# Patient Record
Sex: FEMALE | Race: WHITE | NOT HISPANIC OR LATINO | Employment: FULL TIME | URBAN - METROPOLITAN AREA
[De-identification: names, ages, dates, MRNs, and addresses within clinical notes are randomized per-mention and may not be internally consistent; named-entity substitution may affect disease eponyms.]

---

## 2024-01-24 ENCOUNTER — OFFICE VISIT (OUTPATIENT)
Dept: OBGYN CLINIC | Facility: CLINIC | Age: 54
End: 2024-01-24
Payer: COMMERCIAL

## 2024-01-24 VITALS
DIASTOLIC BLOOD PRESSURE: 70 MMHG | WEIGHT: 155.4 LBS | SYSTOLIC BLOOD PRESSURE: 128 MMHG | HEIGHT: 64 IN | BODY MASS INDEX: 26.53 KG/M2

## 2024-01-24 DIAGNOSIS — Z01.419 WOMEN'S ANNUAL ROUTINE GYNECOLOGICAL EXAMINATION: Primary | ICD-10-CM

## 2024-01-24 DIAGNOSIS — Z12.4 SCREENING FOR CERVICAL CANCER: ICD-10-CM

## 2024-01-24 DIAGNOSIS — Z12.31 BREAST CANCER SCREENING BY MAMMOGRAM: ICD-10-CM

## 2024-01-24 PROCEDURE — S0610 ANNUAL GYNECOLOGICAL EXAMINA: HCPCS | Performed by: STUDENT IN AN ORGANIZED HEALTH CARE EDUCATION/TRAINING PROGRAM

## 2024-01-24 RX ORDER — BUPROPION HYDROCHLORIDE 300 MG/1
300 TABLET ORAL EVERY MORNING
COMMUNITY
Start: 2023-11-26

## 2024-01-24 RX ORDER — RIZATRIPTAN BENZOATE 10 MG/1
TABLET ORAL
COMMUNITY
Start: 2023-12-15

## 2024-01-24 NOTE — PROGRESS NOTES
[facility] [address], [address], [place] [address]    ASSESSMENT/PLAN: Irma Moyer is a 53 y.o.  who presents for annual gynecologic exam.    Encounter for routine gynecologic examination  - Routine well woman exam completed today.  - Cervical Cancer Screening: Current ASCCP Guidelines reviewed. Last Pap: 2018. History of abnormal: None. Repeat collected today.   - STI screening offered including HIV testing: offered, pt declined  - Breast Cancer Screening: Last Mammogram Not on file. Repeat ordered today.   - Colorectal cancer screening was not ordered, as she reports that she is up to date.   - The following were reviewed in today's visit: breast self exam, mammography screening ordered, menopause, exercise, and healthy diet    Additional problems addressed during this visit:  1. Women's annual routine gynecological examination    2. Breast cancer screening by mammogram  -     Mammo screening bilateral w 3d & cad; Future    3. Screening for cervical cancer  -     Liquid-based pap, screening        CC:  Annual Gynecologic Examination    HPI: Irma Moyer is a 53 y.o.  who presents for annual gynecologic examination. She is without complaint today.     ROS: Negative except as noted in HPI    No LMP recorded. Patient is postmenopausal.       She  reports being sexually active and has had partner(s) who are male. She reports using the following method of birth control/protection: Post-menopausal.  Sexual History  Sexual Assault: No  Sexually Transmitted Infection History: None  Multiple Sex Partners: No  Is Patient in a Monogamous Relationship?: Yes    The following portions of the patient's history were reviewed and updated as appropriate:  Past Medical History:   Diagnosis Date    Hypercholesterolemia     Migraines      Past Surgical History:   Procedure Laterality Date    DILATION AND CURETTAGE OF UTERUS N/A     LAPAROSCOPY N/A 2004    SALPINGECTOMY Bilateral  "2004     Family History   Problem Relation Age of Onset    No Known Problems Mother     Lung cancer Father     Alcohol abuse Father     Hypertension Brother     Obesity Brother     Hashimoto's thyroiditis Daughter     Anxiety disorder Daughter     No Known Problems Son     No Known Problems Maternal Grandmother     Heart disease Maternal Grandfather     Thyroid disease Other     Cancer Paternal Aunt     Breast cancer Neg Hx     Uterine cancer Neg Hx     Ovarian cancer Neg Hx     Colon cancer Neg Hx      Social History     Socioeconomic History    Marital status: /Civil Union     Spouse name: None    Number of children: None    Years of education: None    Highest education level: None   Occupational History    None   Tobacco Use    Smoking status: Former     Types: Cigarettes    Smokeless tobacco: Never   Vaping Use    Vaping status: Never Used   Substance and Sexual Activity    Alcohol use: Yes    Drug use: Never    Sexual activity: Yes     Partners: Male     Birth control/protection: Post-menopausal     Comment: no new partner in past year   Other Topics Concern    None   Social History Narrative    None     Social Determinants of Health     Financial Resource Strain: Not on file   Food Insecurity: Not on file   Transportation Needs: Not on file   Physical Activity: Not on file   Stress: Not on file   Social Connections: Not on file   Intimate Partner Violence: Not on file   Housing Stability: Not on file     Outpatient Medications Marked as Taking for the 1/24/24 encounter (Office Visit) with Sameer Olmedo MD   Medication    buPROPion (WELLBUTRIN XL) 300 mg 24 hr tablet    rizatriptan (MAXALT) 10 mg tablet     No Known Allergies        Objective:  /70 (BP Location: Right arm, Patient Position: Sitting, Cuff Size: Standard)   Ht 5' 3.75\" (1.619 m)   Wt 70.5 kg (155 lb 6.4 oz)   BMI 26.88 kg/m²        Chaperone present? Yes: Elis Armando MA.    General Appearance: alert and oriented, in no acute " distress.   Neck/Thyroid: No thyromegaly, no thyroid nodules.  Respiratory: Unlabored breathing.  Cardiovascular: Regular rate, no peripheral edema.  Abdomen: Soft, non-tender, non-distended, no masses, no rebound or guarding.  Breast Exam: No dimpling, nipple retraction or discharge. No lumps or masses. No axillary or supraclavicular nodes.   Pelvic:       External genitalia: Normal appearance, no abnormal pigmentation, no lesions or masses. Normal Bartholin's and Silverado's.      Urinary system: Urethral meatus normal, bladder non-tender.      Vaginal: normal mucosa without prolapse or lesions. Normal-appearing physiologic discharge.      Cervix: Normal-appearing, well-epithelialized, no gross lesions or masses. No cervical motion tenderness.      Adnexa: No adnexal masses or tenderness noted.      Uterus: Normal-sized, regular contour, midline, mobile, no uterine tenderness.  Extremities: Normal range of motion. Warm, well-perfused, non-tender.   Skin: normal, no rash or abnormalities  Neurologic: alert, oriented x3  Psychiatric: Appropriate affect, mood stable, cooperative with exam.    Sameer Olmedo MD  1/24/2024 4:24 PM

## 2024-07-08 DIAGNOSIS — Z12.31 BREAST CANCER SCREENING BY MAMMOGRAM: ICD-10-CM
